# Patient Record
Sex: FEMALE | Race: OTHER | NOT HISPANIC OR LATINO | URBAN - METROPOLITAN AREA
[De-identification: names, ages, dates, MRNs, and addresses within clinical notes are randomized per-mention and may not be internally consistent; named-entity substitution may affect disease eponyms.]

---
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
## 2022-03-01 ENCOUNTER — RX RENEWAL (OUTPATIENT)
Age: 22
End: 2022-03-01

## 2022-05-19 ENCOUNTER — APPOINTMENT (OUTPATIENT)
Dept: PEDIATRIC DEVELOPMENTAL SERVICES | Facility: CLINIC | Age: 22
End: 2022-05-19
Payer: COMMERCIAL

## 2022-05-19 VITALS
DIASTOLIC BLOOD PRESSURE: 62 MMHG | BODY MASS INDEX: 21.28 KG/M2 | HEIGHT: 59.5 IN | SYSTOLIC BLOOD PRESSURE: 106 MMHG | HEART RATE: 82 BPM | WEIGHT: 107 LBS

## 2022-05-19 PROCEDURE — 99214 OFFICE O/P EST MOD 30 MIN: CPT

## 2023-01-09 ENCOUNTER — RX RENEWAL (OUTPATIENT)
Age: 23
End: 2023-01-09

## 2023-03-03 ENCOUNTER — APPOINTMENT (OUTPATIENT)
Dept: PEDIATRIC DEVELOPMENTAL SERVICES | Facility: CLINIC | Age: 23
End: 2023-03-03
Payer: COMMERCIAL

## 2023-03-03 VITALS
SYSTOLIC BLOOD PRESSURE: 98 MMHG | HEIGHT: 59.45 IN | WEIGHT: 101.13 LBS | HEART RATE: 90 BPM | BODY MASS INDEX: 20.12 KG/M2 | DIASTOLIC BLOOD PRESSURE: 52 MMHG

## 2023-03-03 PROCEDURE — 99214 OFFICE O/P EST MOD 30 MIN: CPT

## 2023-03-03 RX ORDER — BROMPHENIRAMINE MALEATE, PSEUDOEPHEDRINE HYDROCHLORIDE, 2; 30; 10 MG/5ML; MG/5ML; MG/5ML
30-2-10 SYRUP ORAL
Qty: 200 | Refills: 0 | Status: COMPLETED | COMMUNITY
Start: 2021-11-05 | End: 2023-03-03

## 2023-03-03 RX ORDER — IPRATROPIUM BROMIDE 21 UG/1
0.03 SPRAY NASAL
Qty: 30 | Refills: 0 | Status: COMPLETED | COMMUNITY
Start: 2021-11-14 | End: 2023-03-03

## 2023-03-07 NOTE — HISTORY OF PRESENT ILLNESS
[Other ___] : [unfilled]` [TWNoteComboBox1] : College [FreeTextEntry1] : CAMPBELL is a 22 year old female with anxiety and ADHD. CAMPBELL is taking bupropion XL 150mg daily which has decreased her anxiety. Other people who have not seen her in a while tell her that she is so much calmer. She has occasional situational anxiety. She has been taking Vyvanse 30mg in AM to target ADHD which has been helpful in improving her focus; at times, she takes Adderall 5mg in afternoon as a booster for focus. Her grades have been good and she is doing well in school.\par  [Major Illness] : no major illness [Major Injury] : no major injury [Surgery] : no surgery [Hospitalizations] : no hospitalizations [New Medications] : no new medication [New Allergies] : no new allergies [FreeTextEntry6] : methylphenidate 10mg and methylphenidate ER 27mg made her very anxious

## 2023-03-07 NOTE — PHYSICAL EXAM
[Normal] : awake and interactive [de-identified] : CAMPBELL denied symptoms of anxiety and/or depression. She denied suicidal ideations and homicidal ideations. She denied use of drugs, tobacco, vape, and alcohol.

## 2023-03-07 NOTE — REASON FOR VISIT
[Follow-Up Visit] : a follow-up visit for [ADHD] : ADHD [Anxiety] : anxiety [Patient] : patient [FreeTextEntry3] : [birth year]

## 2023-03-07 NOTE — PLAN
[Continue present medication regimen _____] : - Continue present medication regimen [unfilled] [Follow-up visit (med treatment monitoring): ____] : - Follow-up visit in [unfilled]  to evaluate response to medication and monitoring of medication treatment [Findings (To Date)] : Findings from evaluation (to date) [Co-Morbidities] : Clinical disorders and problem commonly associated with this child's condition (now or in the future) [Prognosis] : Prognosis [Compliance] : Importance of medication compliance [AE Strategies] : Strategies to reduce side effects from current or proposed medication regimen [SSRIs] : Potential benefits and limitations of treatment with SSRIs.  Potential adverse events were also reviewed, including suicidal ideation, kevan/activation, nausea, headache, diarrhea/constipation, somnolence, vision changes, rash, etc.  Advised to seek immediate medical attention if any severe side effects or suicidal ideation.

## 2023-03-27 ENCOUNTER — TRANSCRIPTION ENCOUNTER (OUTPATIENT)
Age: 23
End: 2023-03-27

## 2023-04-13 ENCOUNTER — RX CHANGE (OUTPATIENT)
Age: 23
End: 2023-04-13

## 2023-04-13 RX ORDER — BUPROPION HYDROCHLORIDE 150 MG/1
150 TABLET, EXTENDED RELEASE ORAL
Qty: 30 | Refills: 3 | Status: DISCONTINUED | COMMUNITY
Start: 2021-12-29 | End: 2023-04-13

## 2023-05-01 ENCOUNTER — TRANSCRIPTION ENCOUNTER (OUTPATIENT)
Age: 23
End: 2023-05-01

## 2023-05-08 ENCOUNTER — RX CHANGE (OUTPATIENT)
Age: 23
End: 2023-05-08

## 2023-06-15 ENCOUNTER — TRANSCRIPTION ENCOUNTER (OUTPATIENT)
Age: 23
End: 2023-06-15

## 2023-07-31 ENCOUNTER — NON-APPOINTMENT (OUTPATIENT)
Age: 23
End: 2023-07-31

## 2023-07-31 ENCOUNTER — TRANSCRIPTION ENCOUNTER (OUTPATIENT)
Age: 23
End: 2023-07-31

## 2023-08-09 ENCOUNTER — RX RENEWAL (OUTPATIENT)
Age: 23
End: 2023-08-09

## 2023-09-01 ENCOUNTER — APPOINTMENT (OUTPATIENT)
Dept: PEDIATRIC DEVELOPMENTAL SERVICES | Facility: CLINIC | Age: 23
End: 2023-09-01
Payer: COMMERCIAL

## 2023-09-01 DIAGNOSIS — F90.0 ATTENTION-DEFICIT HYPERACTIVITY DISORDER, PREDOMINANTLY INATTENTIVE TYPE: ICD-10-CM

## 2023-09-01 DIAGNOSIS — F41.1 GENERALIZED ANXIETY DISORDER: ICD-10-CM

## 2023-09-01 DIAGNOSIS — Z72.821 INADEQUATE SLEEP HYGIENE: ICD-10-CM

## 2023-09-01 PROCEDURE — 99214 OFFICE O/P EST MOD 30 MIN: CPT | Mod: 95

## 2023-09-01 NOTE — PLAN
[Follow-up visit (med treatment monitoring): ____] : - Follow-up visit in [unfilled]  to evaluate response to medication and monitoring of medication treatment [Findings (To Date)] : Findings from evaluation (to date) [Co-Morbidities] : Clinical disorders and problem commonly associated with this child's condition (now or in the future) [Prognosis] : Prognosis [Compliance] : Importance of medication compliance [AE Strategies] : Strategies to reduce side effects from current or proposed medication regimen [SSRIs] : Potential benefits and limitations of treatment with SSRIs.  Potential adverse events were also reviewed, including suicidal ideation, kevan/activation, nausea, headache, diarrhea/constipation, somnolence, vision changes, rash, etc.  Advised to seek immediate medical attention if any severe side effects or suicidal ideation. [Med Options Discussed: _____] : - Medication options discussed [unfilled] [Clinical Basis] : Clinical basis for current diagnosis and clinical impressions [Goals / Benefits] : Goals & potential benefits of treatment with medication, as well as the limitations of pharmacotherapy [Stimulants] : Potential benefits and limitations of treatment with stimulant medication.  Potential adverse events were also reviewed, including insomnia, reduced appetite, change in blood pressure or heart rate, headache, stomachache, slowing of growth, moodiness, and onset of tics [Drugs / Alcohol] : Drugs / Alcohol [Diversion] : Medical and legal consequences of medication diversion [Other: _____] : [unfilled] [FreeTextEntry1] : Sushil Bates MD Director, Division of Developmental-Behavioral Pediatrics [facility], [facility] Certified Developmental-Behavioral Pediatrician

## 2023-09-01 NOTE — REASON FOR VISIT
[Follow-Up Visit] : a follow-up visit for [ADHD] : ADHD [Anxiety] : anxiety [Patient] : patient [FreeTextEntry3] : [birth year]

## 2023-09-01 NOTE — HISTORY OF PRESENT ILLNESS
[Home] : at home, [unfilled] , at the time of the visit. 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  [TWNoteComboBox1] : College [FreeTextEntry1] : CAMPBELL is a 23-year-old female with anxiety and ADHD. CAMPBELL is taking bupropion XL 150mg daily which worked well to decrease her anxiety last year but has realized that some old anxious tendencies have resurfaced gradually. She has more stressors overall. She denied panic attacks. CAMPBELL denied suicidal ideations or homicidal ideations. She denied use of drugs, tobacco, vape, and has an occasional alcoholic beverage.  She takes Vyvanse 30mg in AM to target ADHD which has been helpful in improving her focus. She can tell when the medication wears off and may feel tired. There are some occasions where she feels tired about an hour after taking the medication. She takes Adderall 5mg in afternoon as a booster for focus which she stated worked well.  [Major Illness] : no major illness [Major Injury] : no major injury [Surgery] : no surgery [Hospitalizations] : no hospitalizations [New Medications] : no new medication [New Allergies] : no new allergies [FreeTextEntry6] : methylphenidate 10mg and methylphenidate ER 27mg made her very anxious; initially had headaches when started bupropion XL 150mg daily

## 2023-09-01 NOTE — PHYSICAL EXAM
[Normal] : awake and interactive [Person] : oriented to person [Place] : oriented to place [Time] : oriented to time

## 2023-10-02 ENCOUNTER — RX CHANGE (OUTPATIENT)
Age: 23
End: 2023-10-02

## 2023-10-02 RX ORDER — BUPROPION HYDROCHLORIDE 300 MG/1
300 TABLET, EXTENDED RELEASE ORAL DAILY
Qty: 30 | Refills: 1 | Status: DISCONTINUED | COMMUNITY
Start: 2023-05-08 | End: 2023-10-02

## 2023-10-10 ENCOUNTER — TRANSCRIPTION ENCOUNTER (OUTPATIENT)
Age: 23
End: 2023-10-10

## 2023-10-10 ENCOUNTER — NON-APPOINTMENT (OUTPATIENT)
Age: 23
End: 2023-10-10

## 2023-11-12 ENCOUNTER — TRANSCRIPTION ENCOUNTER (OUTPATIENT)
Age: 23
End: 2023-11-12

## 2023-11-12 ENCOUNTER — NON-APPOINTMENT (OUTPATIENT)
Age: 23
End: 2023-11-12

## 2023-11-20 ENCOUNTER — TRANSCRIPTION ENCOUNTER (OUTPATIENT)
Age: 23
End: 2023-11-20

## 2024-01-11 ENCOUNTER — TRANSCRIPTION ENCOUNTER (OUTPATIENT)
Age: 24
End: 2024-01-11

## 2024-01-11 ENCOUNTER — NON-APPOINTMENT (OUTPATIENT)
Age: 24
End: 2024-01-11

## 2024-02-20 ENCOUNTER — NON-APPOINTMENT (OUTPATIENT)
Age: 24
End: 2024-02-20

## 2024-02-20 ENCOUNTER — TRANSCRIPTION ENCOUNTER (OUTPATIENT)
Age: 24
End: 2024-02-20

## 2024-03-15 ENCOUNTER — TRANSCRIPTION ENCOUNTER (OUTPATIENT)
Age: 24
End: 2024-03-15

## 2024-03-15 RX ORDER — BUPROPION HYDROCHLORIDE 300 MG/1
300 TABLET, EXTENDED RELEASE ORAL
Qty: 30 | Refills: 3 | Status: ACTIVE | COMMUNITY
Start: 2023-10-02 | End: 1900-01-01

## 2024-04-05 ENCOUNTER — TRANSCRIPTION ENCOUNTER (OUTPATIENT)
Age: 24
End: 2024-04-05

## 2024-04-05 ENCOUNTER — NON-APPOINTMENT (OUTPATIENT)
Age: 24
End: 2024-04-05

## 2024-04-05 RX ORDER — LISDEXAMFETAMINE DIMESYLATE 30 MG/1
30 CAPSULE ORAL
Qty: 30 | Refills: 0 | Status: ACTIVE | COMMUNITY
Start: 2021-11-26 | End: 1900-01-01

## 2024-04-05 RX ORDER — DEXTROAMPHETAMINE SACCHARATE, AMPHETAMINE ASPARTATE, DEXTROAMPHETAMINE SULFATE AND AMPHETAMINE SULFATE 2.5; 2.5; 2.5; 2.5 MG/1; MG/1; MG/1; MG/1
10 TABLET ORAL
Qty: 30 | Refills: 0 | Status: ACTIVE | COMMUNITY
Start: 2021-11-26 | End: 1900-01-01

## 2024-05-24 ENCOUNTER — TRANSCRIPTION ENCOUNTER (OUTPATIENT)
Age: 24
End: 2024-05-24

## 2024-05-24 RX ORDER — BUPROPION HYDROCHLORIDE 150 MG/1
150 TABLET, EXTENDED RELEASE ORAL
Qty: 30 | Refills: 3 | Status: COMPLETED | COMMUNITY
Start: 2023-04-13 | End: 2024-05-24

## 2024-05-24 RX ORDER — LISDEXAMFETAMINE DIMESYLATE 30 MG/1
30 TABLET, CHEWABLE ORAL
Qty: 30 | Refills: 0 | Status: DISCONTINUED | COMMUNITY
Start: 2023-09-01 | End: 2024-05-24

## 2024-06-19 ENCOUNTER — TRANSCRIPTION ENCOUNTER (OUTPATIENT)
Age: 24
End: 2024-06-19